# Patient Record
Sex: MALE | HISPANIC OR LATINO
[De-identification: names, ages, dates, MRNs, and addresses within clinical notes are randomized per-mention and may not be internally consistent; named-entity substitution may affect disease eponyms.]

---

## 2017-03-13 PROBLEM — Z00.00 ENCOUNTER FOR PREVENTIVE HEALTH EXAMINATION: Status: ACTIVE | Noted: 2017-03-13

## 2017-03-16 ENCOUNTER — APPOINTMENT (OUTPATIENT)
Dept: ULTRASOUND IMAGING | Facility: CLINIC | Age: 42
End: 2017-03-16

## 2020-10-12 ENCOUNTER — EMERGENCY (EMERGENCY)
Facility: HOSPITAL | Age: 45
LOS: 0 days | Discharge: HOME | End: 2020-10-12
Payer: COMMERCIAL

## 2020-10-12 VITALS
TEMPERATURE: 98 F | RESPIRATION RATE: 16 BRPM | DIASTOLIC BLOOD PRESSURE: 96 MMHG | SYSTOLIC BLOOD PRESSURE: 148 MMHG | HEART RATE: 72 BPM | OXYGEN SATURATION: 95 %

## 2020-10-12 DIAGNOSIS — Z20.828 CONTACT WITH AND (SUSPECTED) EXPOSURE TO OTHER VIRAL COMMUNICABLE DISEASES: ICD-10-CM

## 2020-10-12 PROCEDURE — U0003: CPT

## 2020-10-12 PROCEDURE — 99283 EMERGENCY DEPT VISIT LOW MDM: CPT

## 2020-10-12 NOTE — ED STATDOCS - NS ED ROS FT
ROS: Constitutional- DENIES fever, chills.  Respiratory- DENIES cough, SOB  Cardiac- no chest pain, no palpitations, ENT- DENIES rhinorrhea, no sore throat, no congestion. DENIES loss of sense of smell or taste. Abdomen- No nausea, no vomiting, no diarrhea.  Urinary- no dysuria, no urgency, no frequency.  Skin- No rashes ~Alexis Lopez PA-C

## 2020-10-12 NOTE — ED STATDOCS - OBJECTIVE STATEMENT
Patient presents to [facility] for screening for COVID-19 for traveling purposes. Patient has no acute S&S of fever, chills, cough, SOB, VARGHESE, n/v/d. No loss of sense of smell or taste. Patient may have been exposed to COVID-19. ~Alexis Lopez PA-C.

## 2020-10-12 NOTE — ED STATDOCS - CLINICAL SUMMARY MEDICAL DECISION MAKING FREE TEXT BOX
Patient presents with concerns for COVID exposure.  As patient is nontoxic appearing, will test for COVID and d/c.  Quarantine reviewed and return precautions reviewed. ~Alexis Lopez PA-C

## 2020-10-12 NOTE — ED ADULT TRIAGE NOTE - ESI TRIAGE ACUITY LEVEL, MLM
4 Notification Instructions: Patient will be notified of biopsy results. However, patient instructed to call the office if not contacted within 2 weeks. Bill For Surgical Tray: no Lab Facility: [id number] Wound Care: Vaseline Path Notes (To The Dermatopathologist): 0.6 Electrodesiccation Text: The wound bed was treated with electrodesiccation after the biopsy was performed. X Size Of Lesion In Cm: 0 Depth Of Biopsy: dermis Anesthesia Volume In Cc (Will Not Render If 0): 0.5 Biopsy Type: H and E Consent: Verbal consent was obtained and risks were reviewed including but not limited to scarring, infection, bleeding, scabbing, incomplete removal, nerve damage and allergy to anesthesia. Type Of Destruction Used: Curettage Dressing: bandage Electrodesiccation And Curettage Text: The wound bed was treated with electrodesiccation and curettage after the biopsy was performed. Hemostasis: Drysol Silver Nitrate Text: The wound bed was treated with silver nitrate after the biopsy was performed. Was A Bandage Applied: Yes Billing Type: Third-Party Bill Detail Level: Detailed Biopsy Method: Personna blade Curettage Text: The wound bed was treated with curettage after the biopsy was performed. Lab: [id number] Cryotherapy Text: The wound bed was treated with cryotherapy after the biopsy was performed. Anesthesia Type: 2% lidocaine with epinephrine Post-Care Instructions: I reviewed with the patient in detail post-care instructions. Patient is to keep the biopsy site dry overnight, and then apply vaseline twice daily under a bandaid until healed. Billing Type: Third-Party Bill Size Of Lesion In Cm: 0.7 Lab: [id number] Lab Facility: [id number]

## 2020-10-12 NOTE — ED STATDOCS - PATIENT PORTAL LINK FT
You can access the FollowMyHealth Patient Portal offered by [facility] by registering at the following website: [email or web address]. By joining [employer]’s FollowMyHealth portal, you will also be able to view your health information using other applications (apps) compatible with our system.

## 2020-10-12 NOTE — ED STATDOCS - PHYSICAL EXAMINATION
PA note: Constitutional: NAD AAOx3  Eyes: EOMI, pupils equal  Head: Normocephalic, atraumatic  ENT: Throat is clear without erythema. No exudates. Airway is patent.  Mouth: no airway obstruction.   Cardiac: S1 S2. regular rate   Resp: Non-labored breathing, no tachypnea. Lungs CTA b/l. No w/r/r. Equal chest expansion and rise. O2sat 100% RA  GI: Abd soft. NT/ND.   Neuro: CN2-12 intact. No focal deficits.   Skin: No rashes  ~Alexis Lopez PA-C

## 2020-10-13 LAB — SARS-COV-2 RNA SPEC QL NAA+PROBE: SIGNIFICANT CHANGE UP
